# Patient Record
Sex: FEMALE | Race: WHITE | NOT HISPANIC OR LATINO | Employment: STUDENT | URBAN - METROPOLITAN AREA
[De-identification: names, ages, dates, MRNs, and addresses within clinical notes are randomized per-mention and may not be internally consistent; named-entity substitution may affect disease eponyms.]

---
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
## 2020-01-06 ENCOUNTER — HOSPITAL ENCOUNTER (OUTPATIENT)
Dept: PULMONOLOGY | Facility: HOSPITAL | Age: 20
Discharge: HOME | End: 2020-01-06
Attending: INTERNAL MEDICINE
Payer: COMMERCIAL

## 2020-01-06 ENCOUNTER — TELEPHONE (OUTPATIENT)
Dept: CARDIOLOGY | Facility: CLINIC | Age: 20
End: 2020-01-06

## 2020-01-06 VITALS
SYSTOLIC BLOOD PRESSURE: 118 MMHG | BODY MASS INDEX: 25.83 KG/M2 | DIASTOLIC BLOOD PRESSURE: 80 MMHG | HEART RATE: 72 BPM | WEIGHT: 155 LBS | HEIGHT: 65 IN

## 2020-01-06 DIAGNOSIS — R00.2 PALPITATIONS: ICD-10-CM

## 2020-01-06 DIAGNOSIS — R55 PRE-SYNCOPE: ICD-10-CM

## 2020-01-06 DIAGNOSIS — R53.82 CHRONIC FATIGUE: ICD-10-CM

## 2020-01-06 DIAGNOSIS — R06.09 DOE (DYSPNEA ON EXERTION): ICD-10-CM

## 2020-01-06 LAB
AORTIC ROOT ANNULUS: 2.7 CM
AV INDEX (PROSTH): 0.79
AV MEAN GRADIENT: 2 MMHG
AV PEAK GRADIENT: 3 MMHG
AV VALVE AREA: 2.86 CM2
AV VELOCITY RATIO: 0.86
BSA FOR ECHO PROCEDURE: 1.8 M2
CV ECHO LV RWT: 0.41 CM
DOP CALC AO PEAK VEL: 0.86 M/S
DOP CALC AO VTI: 19.48 CM
DOP CALC LVOT AREA: 3.6 CM2
DOP CALC LVOT DIAMETER: 2.15 CM
DOP CALC LVOT PEAK VEL: 0.74 M/S
DOP CALC LVOT STROKE VOLUME: 55.77 CM3
DOP CALCLVOT PEAK VEL VTI: 15.37 CM
E WAVE DECELERATION TIME: 179.42 MSEC
E/A RATIO: 1.8
ECHO LV POSTERIOR WALL: 0.83 CM (ref 0.6–1.1)
FRACTIONAL SHORTENING: 39 % (ref 28–44)
INTERVENTRICULAR SEPTUM: 1.09 CM (ref 0.6–1.1)
IVRT: 0.12 MSEC
LA MAJOR: 3.84 CM
LA MINOR: 3.93 CM
LA WIDTH: 2.77 CM
LEFT ATRIUM SIZE: 3.08 CM
LEFT ATRIUM VOLUME INDEX: 15.9 ML/M2
LEFT ATRIUM VOLUME: 28.17 CM3
LEFT INTERNAL DIMENSION IN SYSTOLE: 2.48 CM (ref 2.1–4)
LEFT VENTRICLE DIASTOLIC VOLUME INDEX: 40.56 ML/M2
LEFT VENTRICLE DIASTOLIC VOLUME: 71.99 ML
LEFT VENTRICLE MASS INDEX: 69 G/M2
LEFT VENTRICLE SYSTOLIC VOLUME INDEX: 12.4 ML/M2
LEFT VENTRICLE SYSTOLIC VOLUME: 21.99 ML
LEFT VENTRICULAR INTERNAL DIMENSION IN DIASTOLE: 4.05 CM (ref 3.5–6)
LEFT VENTRICULAR MASS: 122.36 G
MV PEAK A VEL: 0.55 M/S
MV PEAK E VEL: 0.99 M/S
PISA TR MAX VEL: 1.99 M/S
PULM VEIN S/D RATIO: 0.91
PV PEAK D VEL: 0.56 M/S
PV PEAK S VEL: 0.51 M/S
PV PEAK VELOCITY: 1.03 CM/S
RA MAJOR: 3.79 CM
RA PRESSURE: 3 MMHG
RIGHT VENTRICULAR END-DIASTOLIC DIMENSION: 2.56 CM
STJ: 3.04 CM
TR MAX PG: 16 MMHG
TV REST PULMONARY ARTERY PRESSURE: 19 MMHG

## 2020-01-06 PROCEDURE — 93306 ECHO (CUPID ONLY): ICD-10-PCS | Mod: 26,,, | Performed by: INTERNAL MEDICINE

## 2020-01-06 PROCEDURE — 93306 TTE W/DOPPLER COMPLETE: CPT

## 2020-01-06 PROCEDURE — 93306 TTE W/DOPPLER COMPLETE: CPT | Mod: 26,,, | Performed by: INTERNAL MEDICINE

## 2020-01-07 ENCOUNTER — TELEPHONE (OUTPATIENT)
Dept: CARDIOLOGY | Facility: CLINIC | Age: 20
End: 2020-01-07

## 2020-01-07 NOTE — TELEPHONE ENCOUNTER
Spoke with patient & updated on Holter test ordered. Pt. states Tuesdays is the best day to sched.test. Olga in sched. aware & working on getting test scheduled as soon as possible.

## 2020-01-22 ENCOUNTER — HOSPITAL ENCOUNTER (OUTPATIENT)
Dept: PULMONOLOGY | Facility: HOSPITAL | Age: 20
Discharge: HOME | End: 2020-01-22
Attending: INTERNAL MEDICINE
Payer: COMMERCIAL

## 2020-01-22 PROCEDURE — 93226 XTRNL ECG REC<48 HR SCAN A/R: CPT

## 2020-01-22 PROCEDURE — 93227 XTRNL ECG REC<48 HR R&I: CPT | Mod: ,,, | Performed by: INTERNAL MEDICINE

## 2020-01-22 PROCEDURE — 93227 HOLTER MONITOR - 48 HOUR (CUPID ONLY): ICD-10-PCS | Mod: ,,, | Performed by: INTERNAL MEDICINE

## 2020-01-28 LAB
OHS CV EVENT MONITOR DAY: 0
OHS CV HOLTER LENGTH DECIMAL HOURS: 48
OHS CV HOLTER LENGTH HOURS: 48
OHS CV HOLTER LENGTH MINUTES: 0

## 2020-02-07 ENCOUNTER — OFFICE VISIT (OUTPATIENT)
Dept: CARDIOLOGY | Facility: CLINIC | Age: 20
End: 2020-02-07
Payer: COMMERCIAL

## 2020-02-07 VITALS
HEIGHT: 65 IN | OXYGEN SATURATION: 99 % | HEART RATE: 88 BPM | SYSTOLIC BLOOD PRESSURE: 108 MMHG | DIASTOLIC BLOOD PRESSURE: 78 MMHG | WEIGHT: 158 LBS | BODY MASS INDEX: 26.33 KG/M2

## 2020-02-07 DIAGNOSIS — R06.09 DOE (DYSPNEA ON EXERTION): ICD-10-CM

## 2020-02-07 DIAGNOSIS — R00.2 PALPITATIONS: ICD-10-CM

## 2020-02-07 DIAGNOSIS — R55 PRE-SYNCOPE: ICD-10-CM

## 2020-02-07 PROCEDURE — 99214 OFFICE O/P EST MOD 30 MIN: CPT | Mod: S$GLB,,, | Performed by: INTERNAL MEDICINE

## 2020-02-07 PROCEDURE — 99214 PR OFFICE/OUTPT VISIT, EST, LEVL IV, 30-39 MIN: ICD-10-PCS | Mod: S$GLB,,, | Performed by: INTERNAL MEDICINE

## 2020-02-07 PROCEDURE — 3008F PR BODY MASS INDEX (BMI) DOCUMENTED: ICD-10-PCS | Mod: CPTII,S$GLB,, | Performed by: INTERNAL MEDICINE

## 2020-02-07 PROCEDURE — 3008F BODY MASS INDEX DOCD: CPT | Mod: CPTII,S$GLB,, | Performed by: INTERNAL MEDICINE

## 2020-02-07 PROCEDURE — 99999 PR PBB SHADOW E&M-EST. PATIENT-LVL III: CPT | Mod: PBBFAC,,, | Performed by: INTERNAL MEDICINE

## 2020-02-07 PROCEDURE — 99999 PR PBB SHADOW E&M-EST. PATIENT-LVL III: ICD-10-PCS | Mod: PBBFAC,,, | Performed by: INTERNAL MEDICINE

## 2020-02-07 NOTE — ASSESSMENT & PLAN NOTE
Resolved.  May have been 2/2 medication.  Pt w/ normal echo and 48hr Holter monitor.  HR has improved.  - no further cardiac w/u required at this time

## 2020-02-07 NOTE — ASSESSMENT & PLAN NOTE
Resolved.  May have been side effect from medication.  Echo and 48hr Holter WNL.    - no further cardiac w/u required at this time  - encouraged pt to resume exercise

## 2020-02-07 NOTE — PROGRESS NOTES
Subjective:    Patient ID:  Court Samuels is a 19 y.o. female who presents for follow-up of Results; Shortness of Breath; and Palpitations      PCP: Erich Carrasquillo MD     Referring Provider: Mathew Vides MD     Pt is a 18 yo F w/ PMH of Hashimoto's thyroiditis (2yr ago) who presents for f/u of palpitations and presyncope x4 yrs.  She noted her symptoms have worsened over the last 2 months as she has been having more frequent episodes.  She noted palpitations and high heart rate w/ moderate exertion which is a/w presyncope however she has never had syncope.  She also noted [place] w/ moderate activities.  At last appt she had an echo and 48 hr Holter which both were WNL.  She mentions that since her last appt she stopped taking vilazodone which she attributes as the cause of her symptoms and denies any further episodes of sob, presyncope, or palpitations.  She denies cp, edema, orthopnea, PND, LOC, or claudication.  She is compliant w/ her other medical therapy.  She did not exercise much due to the fatigue and palpitations however plans to resume her usual activity soon.  She monitors her HR via a fit bit which notes her HR to have decreased w/ her resting rate in the 80s.         Past Medical History:   Diagnosis Date    Hashimoto's thyroiditis      Past Surgical History:   Procedure Laterality Date    ANKLE SURGERY Left 07/20/2018    WISDOM TOOTH EXTRACTION       Social History     Socioeconomic History    Marital status: Single     Spouse name: Not on file    Number of children: Not on file    Years of education: Not on file    Highest education level: Not on file   Occupational History    Not on file   Social Needs    Financial resource strain: Not on file    Food insecurity:     Worry: Not on file     Inability: Not on file    Transportation needs:     Medical: Not on file     Non-medical: Not on file   Tobacco Use    Smoking status: Never Smoker    Smokeless tobacco: Never Used   Substance and  Sexual Activity    Alcohol use: Never     Frequency: Never    Drug use: Never    Sexual activity: Never   Lifestyle    Physical activity:     Days per week: Not on file     Minutes per session: Not on file    Stress: Not on file   Relationships    Social connections:     Talks on phone: Not on file     Gets together: Not on file     Attends [employer] service: Not on file     Active member of club or organization: Not on file     Attends meetings of clubs or organizations: Not on file     Relationship status: Not on file   Other Topics Concern    Not on file   Social History Narrative    Not on file     Family History   Problem Relation Age of Onset    Supraventricular tachycardia Mother     Thyroid disease Maternal Grandmother     Heart disease Maternal Grandfather         CABG    Hypertension Maternal Grandfather     Thyroid disease Paternal Grandfather     Hashimoto's thyroiditis Cousin        Review of patient's allergies indicates:  No Known Allergies    Medication List with Changes/Refills   Current Medications    CETIRIZINE (ZYRTEC) 10 MG TABLET    Take 10 mg by mouth once daily.    DROSPIRENONE-ETHINYL ESTRADIOL (MARSHA) 3-0.02 MG PER TABLET    TK 1 T PO QD    ONDANSETRON (ZOFRAN-ODT) 4 MG TBDL    ondansetron 4 mg disintegrating tablet   Discontinued Medications    VILAZODONE (VIIBRYD) 10 MG TAB TABLET    Take 10 mg by mouth once daily.       Review of Systems   Constitution: Negative for diaphoresis and fever.   HENT: Negative for congestion and hearing loss.    Eyes: Negative for blurred vision and pain.   Cardiovascular: Negative for chest pain, claudication, dyspnea on exertion, leg swelling, near-syncope, orthopnea, palpitations, paroxysmal nocturnal dyspnea and syncope.   Respiratory: Negative for shortness of breath and sleep disturbances due to breathing.    Hematologic/Lymphatic: Negative for bleeding problem. Does not bruise/bleed easily.   Skin: Negative for color change and poor wound  "healing.   Gastrointestinal: Negative for abdominal pain and nausea.   Genitourinary: Negative for bladder incontinence and flank pain.   Neurological: Negative for focal weakness and light-headedness.        Objective:   /78 (BP Location: Left arm, Patient Position: Sitting, BP Method: Large (Manual))   Pulse 88   Ht 5' 5" (1.651 m)   Wt 71.7 kg (158 lb)   SpO2 99%   BMI 26.29 kg/m²    Physical Exam   Constitutional: She is oriented to person, place, and time. She appears well-developed and well-nourished.   HENT:   Head: Normocephalic and atraumatic.   Mouth/Throat: Oropharynx is clear and moist.   Eyes: Pupils are equal, round, and reactive to light. EOM are normal. No scleral icterus.   Neck: Normal range of motion. Neck supple. No JVD present.   Cardiovascular: Normal rate, regular rhythm, S1 normal, S2 normal and intact distal pulses. Exam reveals no gallop and no friction rub.   No murmur heard.  Pulmonary/Chest: Effort normal and breath sounds normal. No respiratory distress. She has no wheezes. She has no rales. She exhibits no tenderness.   Abdominal: Soft. Bowel sounds are normal. She exhibits no distension and no mass. There is no tenderness. There is no rebound.   Musculoskeletal: Normal range of motion. She exhibits no edema or tenderness.   Neurological: She is alert and oriented to person, place, and time. She displays normal reflexes. Coordination normal.   Skin: Skin is warm and dry. She is not diaphoretic. No pallor.   Psychiatric: She has a normal mood and affect. Her behavior is normal. Judgment normal.         Assessment:       1. Pre-syncope    2. Palpitations    3. QUEVEDO (dyspnea on exertion)         Plan:         Pre-syncope  Resolved.  May have been 2/2 medication.  Pt w/ normal echo and 48hr Holter monitor.  HR has improved.  - no further cardiac w/u required at this time    Palpitations  HR improved.  Holter WNL.    - no further cardiac w/u required at this time    QUEVEDO (dyspnea " on exertion)  Resolved.  May have been side effect from medication.  Echo and 48hr Holter WNL.    - no further cardiac w/u required at this time  - encouraged pt to resume exercise      Total duration of face to face visit time 30 minutes.  Total time spent counseling greater than fifty percent of total visit time.  Counseling included discussion regarding imaging findings, diagnosis, possibilities, treatment options, risks and benefits.  The patient had many questions regarding the options and long-term effects      Zach Bobby M.D.  Interventional Cardiology

## 2020-08-27 NOTE — TELEPHONE ENCOUNTER
Attempted to call pt. No answer. Holter test in process of being scheduled thru sched.dept.(noris Villagran). ----- Message from Sandy Youssef sent at 1/6/2020  1:18 PM CST -----  Contact: Self [phone]  Patient is calling to talk to nurse in regards to her Holter monitor appointment has to be rescheduled since the location at [place] was not able to do it at this moment.     
negative -  no rash